# Patient Record
Sex: FEMALE | Race: WHITE | NOT HISPANIC OR LATINO | URBAN - METROPOLITAN AREA
[De-identification: names, ages, dates, MRNs, and addresses within clinical notes are randomized per-mention and may not be internally consistent; named-entity substitution may affect disease eponyms.]

---

## 2018-06-05 ENCOUNTER — EMERGENCY (EMERGENCY)
Facility: HOSPITAL | Age: 73
LOS: 1 days | Discharge: HOME | End: 2018-06-05
Attending: EMERGENCY MEDICINE | Admitting: EMERGENCY MEDICINE
Payer: MEDICARE

## 2018-06-05 VITALS
SYSTOLIC BLOOD PRESSURE: 109 MMHG | DIASTOLIC BLOOD PRESSURE: 71 MMHG | HEART RATE: 81 BPM | OXYGEN SATURATION: 95 % | RESPIRATION RATE: 16 BRPM | TEMPERATURE: 98 F

## 2018-06-05 VITALS
WEIGHT: 153 LBS | SYSTOLIC BLOOD PRESSURE: 129 MMHG | TEMPERATURE: 98 F | RESPIRATION RATE: 18 BRPM | DIASTOLIC BLOOD PRESSURE: 71 MMHG | HEART RATE: 91 BPM | OXYGEN SATURATION: 94 %

## 2018-06-05 DIAGNOSIS — Z87.891 PERSONAL HISTORY OF NICOTINE DEPENDENCE: ICD-10-CM

## 2018-06-05 DIAGNOSIS — R10.11 RIGHT UPPER QUADRANT PAIN: ICD-10-CM

## 2018-06-05 DIAGNOSIS — J44.9 CHRONIC OBSTRUCTIVE PULMONARY DISEASE, UNSPECIFIED: ICD-10-CM

## 2018-06-05 DIAGNOSIS — R07.89 OTHER CHEST PAIN: ICD-10-CM

## 2018-06-05 LAB
ALBUMIN SERPL ELPH-MCNC: 3.1 G/DL — LOW (ref 3.4–5)
ALP SERPL-CCNC: 91 U/L — SIGNIFICANT CHANGE UP (ref 40–120)
ALT FLD-CCNC: 15 U/L — SIGNIFICANT CHANGE UP (ref 12–42)
AMYLASE P1 CFR SERPL: 40 U/L — SIGNIFICANT CHANGE UP (ref 25–115)
ANION GAP SERPL CALC-SCNC: 8 MMOL/L — LOW (ref 9–16)
APPEARANCE UR: CLEAR — SIGNIFICANT CHANGE UP
AST SERPL-CCNC: 14 U/L — LOW (ref 15–37)
BILIRUB SERPL-MCNC: 0.3 MG/DL — SIGNIFICANT CHANGE UP (ref 0.2–1.2)
BILIRUB UR-MCNC: NEGATIVE — SIGNIFICANT CHANGE UP
BUN SERPL-MCNC: 21 MG/DL — SIGNIFICANT CHANGE UP (ref 7–23)
CALCIUM SERPL-MCNC: 9 MG/DL — SIGNIFICANT CHANGE UP (ref 8.5–10.5)
CHLORIDE SERPL-SCNC: 103 MMOL/L — SIGNIFICANT CHANGE UP (ref 96–108)
CK MB BLD-MCNC: 1.49 % — SIGNIFICANT CHANGE UP
CK MB CFR SERPL CALC: 0.7 NG/ML — SIGNIFICANT CHANGE UP (ref 0.5–3.6)
CK SERPL-CCNC: 47 U/L — SIGNIFICANT CHANGE UP (ref 26–192)
CO2 SERPL-SCNC: 27 MMOL/L — SIGNIFICANT CHANGE UP (ref 22–31)
COLOR SPEC: YELLOW — SIGNIFICANT CHANGE UP
CREAT SERPL-MCNC: 0.92 MG/DL — SIGNIFICANT CHANGE UP (ref 0.5–1.3)
D DIMER BLD IA.RAPID-MCNC: 415 NG/ML DDU — HIGH
DIFF PNL FLD: NEGATIVE — SIGNIFICANT CHANGE UP
GLUCOSE SERPL-MCNC: 102 MG/DL — HIGH (ref 70–99)
GLUCOSE UR QL: NEGATIVE — SIGNIFICANT CHANGE UP
HCT VFR BLD CALC: 36.3 % — SIGNIFICANT CHANGE UP (ref 34.5–45)
HGB BLD-MCNC: 11.7 G/DL — SIGNIFICANT CHANGE UP (ref 11.5–15.5)
INR BLD: 1.06 — SIGNIFICANT CHANGE UP (ref 0.88–1.16)
KETONES UR-MCNC: NEGATIVE — SIGNIFICANT CHANGE UP
LEUKOCYTE ESTERASE UR-ACNC: NEGATIVE — SIGNIFICANT CHANGE UP
LIDOCAIN IGE QN: 77 U/L — SIGNIFICANT CHANGE UP (ref 73–393)
MAGNESIUM SERPL-MCNC: 2 MG/DL — SIGNIFICANT CHANGE UP (ref 1.6–2.6)
MCHC RBC-ENTMCNC: 30.3 PG — SIGNIFICANT CHANGE UP (ref 27–34)
MCHC RBC-ENTMCNC: 32.2 G/DL — SIGNIFICANT CHANGE UP (ref 32–36)
MCV RBC AUTO: 94 FL — SIGNIFICANT CHANGE UP (ref 80–100)
NITRITE UR-MCNC: NEGATIVE — SIGNIFICANT CHANGE UP
NT-PROBNP SERPL-SCNC: 42 PG/ML — SIGNIFICANT CHANGE UP
PH UR: 6 — SIGNIFICANT CHANGE UP (ref 5–8)
PLATELET # BLD AUTO: 306 K/UL — SIGNIFICANT CHANGE UP (ref 150–400)
POTASSIUM SERPL-MCNC: 4.1 MMOL/L — SIGNIFICANT CHANGE UP (ref 3.5–5.3)
POTASSIUM SERPL-SCNC: 4.1 MMOL/L — SIGNIFICANT CHANGE UP (ref 3.5–5.3)
PROT SERPL-MCNC: 8.1 G/DL — SIGNIFICANT CHANGE UP (ref 6.4–8.2)
PROT UR-MCNC: NEGATIVE MG/DL — SIGNIFICANT CHANGE UP
PROTHROM AB SERPL-ACNC: 11.7 SEC — SIGNIFICANT CHANGE UP (ref 9.8–12.7)
RBC # BLD: 3.86 M/UL — SIGNIFICANT CHANGE UP (ref 3.8–5.2)
RBC # FLD: 13 % — SIGNIFICANT CHANGE UP (ref 10.3–16.9)
SODIUM SERPL-SCNC: 138 MMOL/L — SIGNIFICANT CHANGE UP (ref 132–145)
SP GR SPEC: <=1.005 — SIGNIFICANT CHANGE UP (ref 1–1.03)
TROPONIN I SERPL-MCNC: 0.03 NG/ML — SIGNIFICANT CHANGE UP (ref 0.02–0.06)
UROBILINOGEN FLD QL: 0.2 E.U./DL — SIGNIFICANT CHANGE UP
WBC # BLD: 8.6 K/UL — SIGNIFICANT CHANGE UP (ref 3.8–10.5)
WBC # FLD AUTO: 8.6 K/UL — SIGNIFICANT CHANGE UP (ref 3.8–10.5)

## 2018-06-05 PROCEDURE — 99285 EMERGENCY DEPT VISIT HI MDM: CPT | Mod: 25

## 2018-06-05 PROCEDURE — 76705 ECHO EXAM OF ABDOMEN: CPT | Mod: 26

## 2018-06-05 PROCEDURE — 71045 X-RAY EXAM CHEST 1 VIEW: CPT | Mod: 26

## 2018-06-05 PROCEDURE — 71275 CT ANGIOGRAPHY CHEST: CPT | Mod: 26

## 2018-06-05 PROCEDURE — 93010 ELECTROCARDIOGRAM REPORT: CPT

## 2018-06-05 RX ORDER — ONDANSETRON 8 MG/1
4 TABLET, FILM COATED ORAL ONCE
Qty: 0 | Refills: 0 | Status: COMPLETED | OUTPATIENT
Start: 2018-06-05 | End: 2018-06-05

## 2018-06-05 RX ORDER — FAMOTIDINE 10 MG/ML
20 INJECTION INTRAVENOUS ONCE
Qty: 0 | Refills: 0 | Status: COMPLETED | OUTPATIENT
Start: 2018-06-05 | End: 2018-06-05

## 2018-06-05 RX ORDER — HYDROMORPHONE HYDROCHLORIDE 2 MG/ML
0.5 INJECTION INTRAMUSCULAR; INTRAVENOUS; SUBCUTANEOUS ONCE
Qty: 0 | Refills: 0 | Status: DISCONTINUED | OUTPATIENT
Start: 2018-06-05 | End: 2018-06-05

## 2018-06-05 RX ORDER — IPRATROPIUM/ALBUTEROL SULFATE 18-103MCG
3 AEROSOL WITH ADAPTER (GRAM) INHALATION ONCE
Qty: 0 | Refills: 0 | Status: COMPLETED | OUTPATIENT
Start: 2018-06-05 | End: 2018-06-05

## 2018-06-05 RX ORDER — SODIUM CHLORIDE 9 MG/ML
500 INJECTION INTRAMUSCULAR; INTRAVENOUS; SUBCUTANEOUS ONCE
Qty: 0 | Refills: 0 | Status: COMPLETED | OUTPATIENT
Start: 2018-06-05 | End: 2018-06-05

## 2018-06-05 RX ORDER — OXYCODONE AND ACETAMINOPHEN 5; 325 MG/1; MG/1
2 TABLET ORAL ONCE
Qty: 0 | Refills: 0 | Status: DISCONTINUED | OUTPATIENT
Start: 2018-06-05 | End: 2018-06-05

## 2018-06-05 RX ORDER — KETOROLAC TROMETHAMINE 30 MG/ML
15 SYRINGE (ML) INJECTION ONCE
Qty: 0 | Refills: 0 | Status: DISCONTINUED | OUTPATIENT
Start: 2018-06-05 | End: 2018-06-05

## 2018-06-05 RX ADMIN — OXYCODONE AND ACETAMINOPHEN 2 TABLET(S): 5; 325 TABLET ORAL at 23:28

## 2018-06-05 RX ADMIN — SODIUM CHLORIDE 500 MILLILITER(S): 9 INJECTION INTRAMUSCULAR; INTRAVENOUS; SUBCUTANEOUS at 19:30

## 2018-06-05 RX ADMIN — FAMOTIDINE 20 MILLIGRAM(S): 10 INJECTION INTRAVENOUS at 18:43

## 2018-06-05 RX ADMIN — Medication 15 MILLIGRAM(S): at 19:36

## 2018-06-05 RX ADMIN — Medication 15 MILLIGRAM(S): at 21:00

## 2018-06-05 RX ADMIN — SODIUM CHLORIDE 1000 MILLILITER(S): 9 INJECTION INTRAMUSCULAR; INTRAVENOUS; SUBCUTANEOUS at 18:14

## 2018-06-05 RX ADMIN — Medication 3 MILLILITER(S): at 19:36

## 2018-06-05 NOTE — ED PROVIDER NOTE - PROGRESS NOTE DETAILS
signed out to me by guy lott pending CT and US results, US with no acute pathology, CT with no PE, lung changes c/w pts hx, perinephric stranding noted but UA negative, stable for dc home with results given to have her MD review and continue outpatient management.

## 2018-06-05 NOTE — ED PROVIDER NOTE - MEDICAL DECISION MAKING DETAILS
Diff dx includes: ptx, pneumonia, cholecystitis, choledochalithiasis, biliary colic, gastritis, pancreatitis, pe.

## 2018-06-05 NOTE — ED PROVIDER NOTE - OBJECTIVE STATEMENT
72 yo female with pmhx copd and pulmonary nodules to ED c/o 2.5 day hx of ruq, rl chest colicky sharp pain. Denies soa/cough/hemoptysis. denies syncope. No international travel within past 30 days.

## 2018-06-05 NOTE — ED ADULT TRIAGE NOTE - CHIEF COMPLAINT QUOTE
Right flank/ rib pain just under breast. Little shortness of breath. Denies n/v/d. Pt had pneumothorax end of April on left side.

## 2018-06-05 NOTE — ED ADULT NURSE NOTE - OBJECTIVE STATEMENT
2.5 days, pain with deep breath; was d/c from [facility] for pneumothorax and pnemonia; pt states "I am about to start radiation treatments for something in my lung". pt denies nausea, vomiting, diarrhea, chest pain. pt is a 73 year old female who comes into the ED complaining of pain with inspiration for 2.5 days. pt states "I was discharged from [facility] for a pneumothorax, I am feeling bad pain when I breath, its making me breath shallow". pt denies nausae, vomiting, diarrhea, dizziness, fevers, chills. pt states "I am about to start radiation treatments for something in my lung". pt has hx of COPD, arrives to ED with O2 sat 94%; states that is her normal, pt in NAD, placed on cardiac monitor, IV placed, labs sent.

## 2023-07-22 ENCOUNTER — EMERGENCY (EMERGENCY)
Facility: HOSPITAL | Age: 78
LOS: 1 days | Discharge: HOME | End: 2023-07-22
Attending: EMERGENCY MEDICINE | Admitting: EMERGENCY MEDICINE
Payer: MEDICARE

## 2023-07-22 VITALS
TEMPERATURE: 98 F | HEART RATE: 73 BPM | SYSTOLIC BLOOD PRESSURE: 129 MMHG | DIASTOLIC BLOOD PRESSURE: 69 MMHG | RESPIRATION RATE: 17 BRPM | OXYGEN SATURATION: 97 %

## 2023-07-22 VITALS
TEMPERATURE: 98 F | SYSTOLIC BLOOD PRESSURE: 121 MMHG | HEART RATE: 82 BPM | WEIGHT: 126.99 LBS | OXYGEN SATURATION: 96 % | DIASTOLIC BLOOD PRESSURE: 72 MMHG | RESPIRATION RATE: 20 BRPM

## 2023-07-22 PROBLEM — K21.9 GASTRO-ESOPHAGEAL REFLUX DISEASE WITHOUT ESOPHAGITIS: Chronic | Status: ACTIVE | Noted: 2018-06-05

## 2023-07-22 PROBLEM — J93.9 PNEUMOTHORAX, UNSPECIFIED: Chronic | Status: ACTIVE | Noted: 2018-06-05

## 2023-07-22 PROCEDURE — 99284 EMERGENCY DEPT VISIT MOD MDM: CPT

## 2023-07-22 PROCEDURE — 73630 X-RAY EXAM OF FOOT: CPT | Mod: 26,RT

## 2023-07-22 PROCEDURE — 73562 X-RAY EXAM OF KNEE 3: CPT | Mod: 26,LT

## 2023-07-22 NOTE — ED PROVIDER NOTE - OBJECTIVE STATEMENT
78-year-old female with a history of lung cancer on home oxygen presenting after mechanical fall last night.  She states she started to lose her balance but caught herself and did not fall all the way down.  She now complains of pain in the right foot, an abrasion to her left forearm, and pain in her left knee.  She states she always had her left knee when she falls and is unsure if that pain is new.  She had a previous fall about 1 week ago and was seen at [facility] and had tulio and sutures placed in the scalp.  She denies hitting her head from this fall.  She denies any headache or neck pain.

## 2023-07-22 NOTE — ED PROVIDER NOTE - PATIENT PORTAL LINK FT
You can access the FollowMyHealth Patient Portal offered by [facility] by registering at the following website: [email or web address]. By joining [facility]’s FollowMyHealth portal, you will also be able to view your health information using other applications (apps) compatible with our system.

## 2023-07-22 NOTE — ED ADULT NURSE NOTE - TEMPLATE LIST FOR HEAD TO TOE ASSESSMENT
Incoming Refill Request      Medication requested (name and dose):  TRAMADOL     Pharmacy where request should be sent: [facility].     Additional details provided by patient: PT. WAS JUST SEEN AND FORGOT TO MENTION HE IS NEEDING HIS TRAMADOL    Best call back number: [phone]    Does the patient have less than a 3 day supply:  [x] Yes  [] No    Salma BECKWITH Rep  05/26/22, 09:12 EDT           Fall

## 2023-07-22 NOTE — ED ADULT NURSE NOTE - NSFALLUNIVINTERV_ED_ALL_ED
Bed/Stretcher in lowest position, wheels locked, appropriate side rails in place/Call bell, personal items and telephone in reach/Instruct patient to call for assistance before getting out of bed/chair/stretcher/Non-slip footwear applied when patient is off stretcher/[place] to call system/Physically safe environment - no spills, clutter or unnecessary equipment/Purposeful proactive rounding/Room/bathroom lighting operational, light cord in reach

## 2023-07-22 NOTE — ED PROVIDER NOTE - PHYSICAL EXAMINATION
VITAL SIGNS: I have reviewed nursing notes and confirm.  CONSTITUTIONAL: Well-developed; well-nourished; in no acute distress.  HEAD: Normocephalic; staples in place to the anterior scalp.  sutures in place to the right upper forehead  EYES: EOM intact; conjunctiva and sclera clear.  ENT: nose appears normal  NECK: Supple, no midline c-spine tenderness  CARD: S1, S2 normal; no murmurs, gallops, or rubs. Regular rate and rhythm., Port palpable on right upper chest wall  RESP: No wheezes, rales or rhonchi.  EXT: gross swelling of the left knee with ecchymosis  NEURO: Alert, oriented. Grossly unremarkable.  PSYCH: Cooperative, appropriate. VITAL SIGNS: I have reviewed nursing notes and confirm.  CONSTITUTIONAL: Well-developed; well-nourished; in no acute distress.  HEAD: Normocephalic; staples in place to the anterior scalp.  sutures in place to the right upper forehead  EYES: EOM intact; conjunctiva and sclera clear.  ENT: nose appears normal  NECK: Supple, no midline c-spine tenderness  CARD: S1, S2 normal; no murmurs, gallops, or rubs. Regular rate and rhythm., Port palpable on right upper chest wall  RESP: No wheezes, rales or rhonchi.  EXT: gross swelling of the left knee with ecchymosis, no swelling or deformity of the right foot  NEURO: Alert, oriented. Grossly unremarkable.  PSYCH: Cooperative, appropriate.

## 2023-07-22 NOTE — ED PROVIDER NOTE - CLINICAL SUMMARY MEDICAL DECISION MAKING FREE TEXT BOX
78-year-old female with a history of lung cancer presents after a fall complaining of right foot and left knee pain as well as a skin tear to the left forearm.  We will clean and dress the forearm wound.  We will obtain x-rays of the left knee and right foot.  Doubt any fracture given that she has been walking without difficulty since the fall.  She has had recurrent falls at home but already has a home health aide.  We will discussed the importance of safety in the home but I do not feel that she warrants admission for placement and she does seem to have appropriate resources at home.

## 2023-07-26 DIAGNOSIS — M79.671 PAIN IN RIGHT FOOT: ICD-10-CM

## 2023-07-26 DIAGNOSIS — C34.90 MALIGNANT NEOPLASM OF UNSPECIFIED PART OF UNSPECIFIED BRONCHUS OR LUNG: ICD-10-CM

## 2023-07-26 DIAGNOSIS — S50.812A ABRASION OF LEFT FOREARM, INITIAL ENCOUNTER: ICD-10-CM

## 2023-07-26 DIAGNOSIS — Z87.09 PERSONAL HISTORY OF OTHER DISEASES OF THE RESPIRATORY SYSTEM: ICD-10-CM
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
## 2023-07-26 DIAGNOSIS — M25.562 PAIN IN LEFT KNEE: ICD-10-CM

## 2023-07-26 DIAGNOSIS — Z87.19 PERSONAL HISTORY OF OTHER DISEASES OF THE DIGESTIVE SYSTEM: ICD-10-CM

## 2023-08-21 ENCOUNTER — TELEPHONE (OUTPATIENT)
Age: 78
End: 2023-08-21

## 2023-08-21 NOTE — TELEPHONE ENCOUNTER
Hi, Doctor. Meg agreed to take my sister Berenice Public on as a new patient. At least he was going to meet with her, but she hasn't moved into the area yet. It won't be until the beginning of September, so I need to reschedule that appointment. I already cancelled the appointment we have, but I need to reschedule it now that she'll be coming around the first week in September. My name is Verner Nova birthday is [id number] [phone]. Her name is Princess Jacobs. Her birthday is May 4th, 1945. Please give me a call back [phone]. Thank you.

## 2023-08-28 ENCOUNTER — EMERGENCY (EMERGENCY)
Facility: HOSPITAL | Age: 78
LOS: 1 days | Discharge: HOME | End: 2023-08-28
Attending: EMERGENCY MEDICINE | Admitting: EMERGENCY MEDICINE
Payer: MEDICARE

## 2023-08-28 VITALS
RESPIRATION RATE: 16 BRPM | OXYGEN SATURATION: 99 % | HEIGHT: 63 IN | SYSTOLIC BLOOD PRESSURE: 111 MMHG | DIASTOLIC BLOOD PRESSURE: 66 MMHG | TEMPERATURE: 98 F | HEART RATE: 69 BPM | WEIGHT: 128.97 LBS

## 2023-08-28 VITALS
TEMPERATURE: 98 F | OXYGEN SATURATION: 100 % | RESPIRATION RATE: 16 BRPM | DIASTOLIC BLOOD PRESSURE: 72 MMHG | HEART RATE: 71 BPM | SYSTOLIC BLOOD PRESSURE: 111 MMHG

## 2023-08-28 PROBLEM — C34.90 MALIGNANT NEOPLASM OF UNSPECIFIED PART OF UNSPECIFIED BRONCHUS OR LUNG: Chronic | Status: ACTIVE | Noted: 2023-07-22

## 2023-08-28 LAB
ALBUMIN SERPL ELPH-MCNC: 3.1 G/DL — LOW (ref 3.4–5)
ALP SERPL-CCNC: 101 U/L — SIGNIFICANT CHANGE UP (ref 40–120)
ALT FLD-CCNC: 15 U/L — SIGNIFICANT CHANGE UP (ref 12–42)
ANION GAP SERPL CALC-SCNC: 3 MMOL/L — LOW (ref 9–16)
AST SERPL-CCNC: 28 U/L — SIGNIFICANT CHANGE UP (ref 15–37)
BASOPHILS # BLD AUTO: 0.04 K/UL — SIGNIFICANT CHANGE UP (ref 0–0.2)
BASOPHILS NFR BLD AUTO: 0.5 % — SIGNIFICANT CHANGE UP (ref 0–2)
BILIRUB SERPL-MCNC: 0.2 MG/DL — SIGNIFICANT CHANGE UP (ref 0.2–1.2)
BUN SERPL-MCNC: 20 MG/DL — SIGNIFICANT CHANGE UP (ref 7–23)
CALCIUM SERPL-MCNC: 9 MG/DL — SIGNIFICANT CHANGE UP (ref 8.5–10.5)
CHLORIDE SERPL-SCNC: 100 MMOL/L — SIGNIFICANT CHANGE UP (ref 96–108)
CO2 SERPL-SCNC: 33 MMOL/L — HIGH (ref 22–31)
CREAT SERPL-MCNC: 0.76 MG/DL — SIGNIFICANT CHANGE UP (ref 0.5–1.3)
EGFR: 81 ML/MIN/1.73M2 — SIGNIFICANT CHANGE UP
EOSINOPHIL # BLD AUTO: 0.01 K/UL — SIGNIFICANT CHANGE UP (ref 0–0.5)
EOSINOPHIL NFR BLD AUTO: 0.1 % — SIGNIFICANT CHANGE UP (ref 0–6)
FLUAV AG NPH QL: SIGNIFICANT CHANGE UP
FLUBV AG NPH QL: SIGNIFICANT CHANGE UP
GLUCOSE SERPL-MCNC: 111 MG/DL — HIGH (ref 70–99)
HCT VFR BLD CALC: 34.5 % — SIGNIFICANT CHANGE UP (ref 34.5–45)
HGB BLD-MCNC: 10.8 G/DL — LOW (ref 11.5–15.5)
IMM GRANULOCYTES NFR BLD AUTO: 0.5 % — SIGNIFICANT CHANGE UP (ref 0–0.9)
LYMPHOCYTES # BLD AUTO: 1.68 K/UL — SIGNIFICANT CHANGE UP (ref 1–3.3)
LYMPHOCYTES # BLD AUTO: 19 % — SIGNIFICANT CHANGE UP (ref 13–44)
MCHC RBC-ENTMCNC: 29.6 PG — SIGNIFICANT CHANGE UP (ref 27–34)
MCHC RBC-ENTMCNC: 31.3 GM/DL — LOW (ref 32–36)
MCV RBC AUTO: 94.5 FL — SIGNIFICANT CHANGE UP (ref 80–100)
MONOCYTES # BLD AUTO: 0.7 K/UL — SIGNIFICANT CHANGE UP (ref 0–0.9)
MONOCYTES NFR BLD AUTO: 7.9 % — SIGNIFICANT CHANGE UP (ref 2–14)
NEUTROPHILS # BLD AUTO: 6.39 K/UL — SIGNIFICANT CHANGE UP (ref 1.8–7.4)
NEUTROPHILS NFR BLD AUTO: 72 % — SIGNIFICANT CHANGE UP (ref 43–77)
NRBC # BLD: 0 /100 WBCS — SIGNIFICANT CHANGE UP (ref 0–0)
PLATELET # BLD AUTO: 326 K/UL — SIGNIFICANT CHANGE UP (ref 150–400)
POTASSIUM SERPL-MCNC: 4.3 MMOL/L — SIGNIFICANT CHANGE UP (ref 3.5–5.3)
POTASSIUM SERPL-SCNC: 4.3 MMOL/L — SIGNIFICANT CHANGE UP (ref 3.5–5.3)
PROT SERPL-MCNC: 7.8 G/DL — SIGNIFICANT CHANGE UP (ref 6.4–8.2)
RBC # BLD: 3.65 M/UL — LOW (ref 3.8–5.2)
RBC # FLD: 14.1 % — SIGNIFICANT CHANGE UP (ref 10.3–14.5)
RSV RNA NPH QL NAA+NON-PROBE: SIGNIFICANT CHANGE UP
SARS-COV-2 RNA SPEC QL NAA+PROBE: SIGNIFICANT CHANGE UP
SODIUM SERPL-SCNC: 136 MMOL/L — SIGNIFICANT CHANGE UP (ref 132–145)
WBC # BLD: 8.86 K/UL — SIGNIFICANT CHANGE UP (ref 3.8–10.5)
WBC # FLD AUTO: 8.86 K/UL — SIGNIFICANT CHANGE UP (ref 3.8–10.5)

## 2023-08-28 PROCEDURE — 70450 CT HEAD/BRAIN W/O DYE: CPT | Mod: 26,MH

## 2023-08-28 PROCEDURE — 72125 CT NECK SPINE W/O DYE: CPT | Mod: 26,MH

## 2023-08-28 PROCEDURE — 73030 X-RAY EXAM OF SHOULDER: CPT | Mod: 26,RT

## 2023-08-28 PROCEDURE — 71045 X-RAY EXAM CHEST 1 VIEW: CPT | Mod: 26

## 2023-08-28 PROCEDURE — 99285 EMERGENCY DEPT VISIT HI MDM: CPT

## 2023-08-28 RX ORDER — ACETAMINOPHEN 500 MG
1000 TABLET ORAL ONCE
Refills: 0 | Status: COMPLETED | OUTPATIENT
Start: 2023-08-28 | End: 2023-08-28

## 2023-08-28 RX ORDER — AZITHROMYCIN 500 MG/1
1 TABLET, FILM COATED ORAL
Qty: 6 | Refills: 0
Start: 2023-08-28 | End: 2023-09-01

## 2023-08-28 RX ORDER — MORPHINE SULFATE 50 MG/1
2 CAPSULE, EXTENDED RELEASE ORAL ONCE
Refills: 0 | Status: DISCONTINUED | OUTPATIENT
Start: 2023-08-28 | End: 2023-08-28

## 2023-08-28 RX ORDER — OXYCODONE AND ACETAMINOPHEN 5; 325 MG/1; MG/1
1 TABLET ORAL
Qty: 8 | Refills: 0
Start: 2023-08-28 | End: 2023-08-29

## 2023-08-28 RX ADMIN — MORPHINE SULFATE 2 MILLIGRAM(S): 50 CAPSULE, EXTENDED RELEASE ORAL at 12:27

## 2023-08-28 RX ADMIN — Medication 400 MILLIGRAM(S): at 08:51

## 2023-08-28 NOTE — ED PROVIDER NOTE - PROGRESS NOTE DETAILS
called CTC for [place] Kenny LLOYD:   Device rep came and put Ben Bolt collar on patient.  Pt has follow up with Dr. Jacobs scheduled.  No numbness or tingling in hands.  Will dc w percocet for pain.  Return precautions discussed.  CXR read w consolidation in upper lung in area of known lung mass.  Will give PO azithro.

## 2023-08-28 NOTE — ED ADULT TRIAGE NOTE - HEIGHT IN FEET
Pt states daughter's  got Covid positive test result back yesterday; daughter was with her Wed shelli and then  started symptoms on Friday and went to get tested. Pt currently doesn't have symptoms but said he has a type of asthma and wants to know what/how does he monitor any symptoms he may end up getting?   Please advise him at [phone] (he will be on-line between 9:15-11:35) 5

## 2023-08-28 NOTE — ED PROVIDER NOTE - CLINICAL SUMMARY MEDICAL DECISION MAKING FREE TEXT BOX
Spoke with neurosurgery consult Dr. Ferrell, CT findings discussed in detail, patient has no neurological deficit or symptoms at this time.  He will arrange for a c-collar to be sent to the [facility] ED.  Her morning home health aide is here in the ER to accompany her home if appropriate.  Will await for collar, if ambulatory and pain is controlled can be discharged.  Signed out to Dr. Cox pending final dispo.

## 2023-08-28 NOTE — ED PROVIDER NOTE - NSFOLLOWUPINSTRUCTIONS_ED_ALL_ED_FT
Follow-up with neurosurgery Dr. Coronel, see below for office address and phone number.  Let the office know you are seen in the ER, and Dr. Mcguire wanted you to follow-up Tuesday or Wednesday of this week.    What is a neck fracture?  A "fracture" is another word for a broken bone. A neck fracture is when a person breaks 1 of the bones in the neck. The medical term for the bones in the neck is "cervical vertebrae" (figure 1).    There are different types of neck fractures, depending on which bone breaks and how it breaks.    Some neck fractures are much more serious than others. In a serious neck fracture, the spinal cord (the bundle of nerves that runs down the back) can also be damaged. This can lead to paralysis. Paralysis is when a person is unable to move certain muscles or parts of their body.    Because a serious neck fracture can lead to paralysis, a person with a possible neck fracture should lie still and not move (or be moved) after an injury. They should wait for emergency workers, who can bring them safely to the hospital. If a person with a neck fracture moves (or is moved), it could make their neck fracture and spinal cord damage much worse.    What are the symptoms of a neck fracture?  Symptoms of a neck fracture can include:    ?Neck pain    ?Neck muscle tightness or spasm    ?Being unable to move the neck or turn the head    ?Weakness or paralysis in the arms or legs    ?Numbness or tingling in the arms or legs    Is there a test for a neck fracture?  Yes. The doctor or nurse will ask about your symptoms and do an exam. They will also order an imaging test, such as an X-ray or CT scan, which creates pictures of the inside of the body. If the doctor suspects you could have spinal cord damage, they might also order another imaging test called an MRI.    How is a neck fracture treated?  Treatment depends on the type of neck fracture a person has, how serious it is, and whether it also causes paralysis.    People with a severe neck fracture or paralysis are treated in the hospital. There, the doctor can monitor them and treat their medical problems. For example, some people might need a breathing tube to help with breathing or medicines to help reduce swelling in the spinal cord.    Treatment of a neck fracture might involve:    ?Surgery – A severe neck fracture might be treated with surgery. During surgery, the doctor will put the neck bones back in the correct position. They will attach or "fuse" the bones to each other with screws or other devices. This helps them stay in place.    ?A "halo" device – If a fracture is serious but the neck bones have not moved out of place, the person might not need surgery. Instead, they might get a device called a "halo." This type of device is held in place with screws that go into the skull. It is attached with bars to a body brace or vest. The device prevents the head and neck from moving while the fracture heals. Sometimes, both surgery and a halo device are needed to treat a fracture.    ?A collar or brace – For some neck fractures, the person might need to wear a special collar or brace. These hold the neck in place while it heals. They do not restrict movement as much as a halo device.    ?Pain medicines – Treatment for a neck fracture also includes medicine to treat the pain. Doctors can prescribe a strong pain medicine if people have a lot of pain or a severe fracture. For mild fractures, doctors usually recommend that people take an over-the-counter pain medicine. Over-the-counter medicines include acetaminophen (sample brand name: Tylenol), ibuprofen (sample brand names: Advil, Motrin), and naproxen (sample brand name: Aleve).    ?Physical therapy – After your fracture heals, you might need to work with a physical therapist (exercise expert). They will teach you exercises to strengthen your neck muscles and help your neck move more easily.    How long does a neck fracture take to heal?  A neck fracture usually takes months to heal, depending on the fracture.    Healing time also depends on the person. Healthy children usually heal much more quickly than older adults or adults with other medical problems.    Can I do anything to improve the healing process?  Yes. It's important to follow all of your doctor's instructions while your fracture is healing. Plus, doctors usually recommend that people with a fracture:    ?Eat a healthy diet that includes enough calcium, vitamin D, and protein (figure 2)    ?Stop smoking. A fracture can take longer to heal if you smoke.    When should I call my doctor or nurse?  After going home, your doctor or nurse will tell you when to call.    Go to the emergency department right away if you:    ?Have trouble breathing    ?Have trouble moving your arms or legs    ?Have trouble controlling your bladder or bowels    Call your doctor or nurse for advice if you:    ?Lose some feeling in your arms or legs    ?Have problems with your neck brace, such as fluid draining from around the screws or sores where the brace presses on your skin    More on this topic

## 2023-08-28 NOTE — ED PROVIDER NOTE - OBJECTIVE STATEMENT
78-year-old female with history of lung cancer, getting chemo infusions intermittently, COPD on continuous home O2 at 2 L nasal cannula, GERD with complaints of posterior neck pain after accidental fall at home.  Patient was sitting on the edge of the bed and slipped off her bed falling forward, according to EMS she extended her neck and then felt pain and tingling down her bilateral arms.  Her home health aide who is with her overnight was able to gently place her on the bed after helping her get off the floor.  On EMS arrival patient's symptoms had started to resolve.  Her arm tingling and numbness resolved on arrival to the ED.  Patient arrived to the ED with c-collar in place. Patient declined pain meds on arrival to the ED.

## 2023-08-28 NOTE — ED POST DISCHARGE NOTE - RESULT SUMMARY
NAILA opacity on peervue. patient is still active in ED, discussed with attending Dr Cox. Will reevaluate patient.

## 2023-08-28 NOTE — ED PROVIDER NOTE - CARE PROVIDER_API CALL
[facility]  Neurosurgery  [address] [address]  Phone: [phone]  Fax: [phone]  Follow Up Time: 1-3 Days

## 2023-08-28 NOTE — ED PROVIDER NOTE - PHYSICAL EXAMINATION
VSS in NAD non toxic appearing   NCAT EOMI PERRL OP clear  midline c spine tenderness approx C6-C7 with some swelling   heart RRR no murmur   lungs CTA no wheezing no rales no rhonchi   abd soft NT ND no CVAT no guarding no rebound   normal neuro exam CN I-XII grossly intact, no groos motor or sensory deficits  no peripheral c/c/e

## 2023-08-28 NOTE — ED ADULT NURSE REASSESSMENT NOTE - NS ED NURSE REASSESS COMMENT FT1
Pt d/c but refusing ambulette transportation home. Pt requires supplemental oxygen but pt is refusing ambulette transportation home. Pt stated "I  will take a lyft home, I will use oxygen when I get home". Pt desats to the low 80's when not on oxygen but pt still refuses to wait for ambulette service. Pt educated on risks of being without oxygen, but pt is still refusing. Pt is A&Ox4, home health aide with pt. MD Cox and ODALIS Gregory made aware.

## 2023-08-28 NOTE — ED ADULT NURSE NOTE - NSFALLRISKINTERV_ED_ALL_ED

## 2023-08-28 NOTE — ED PROVIDER NOTE - PATIENT PORTAL LINK FT
You can access the FollowMyHealth Patient Portal offered by [facility] by registering at the following website: [email or web address]. By joining [facility]’s FollowMyHealth portal, you will also be able to view your health information using other applications (apps) compatible with our system.

## 2023-08-28 NOTE — ED ADULT TRIAGE NOTE - CHIEF COMPLAINT QUOTE
Pt brought in by EMS in a C-collar after a slip off the edge of her bed. Pt states she hit her forehead on the ground then her head went back. Pt complains of neck pain 4/10  and experienced about 20 minutes of bilateral hand tingling that resolved.

## 2023-08-29 ENCOUNTER — APPOINTMENT (OUTPATIENT)
Dept: SPINE | Facility: CLINIC | Age: 78
End: 2023-08-29

## 2023-08-29 PROBLEM — Z00.00 ENCOUNTER FOR PREVENTIVE HEALTH EXAMINATION: Status: ACTIVE | Noted: 2023-08-29

## 2023-08-31 ENCOUNTER — TELEPHONE (OUTPATIENT)
Age: 78
End: 2023-08-31

## 2023-08-31 DIAGNOSIS — J44.9 CHRONIC OBSTRUCTIVE PULMONARY DISEASE, UNSPECIFIED: ICD-10-CM
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
## 2023-08-31 DIAGNOSIS — M54.2 CERVICALGIA: ICD-10-CM
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
## 2023-08-31 DIAGNOSIS — Z85.118 PERSONAL HISTORY OF OTHER MALIGNANT NEOPLASM OF BRONCHUS AND LUNG: ICD-10-CM

## 2023-08-31 DIAGNOSIS — S12.9XXA FRACTURE OF NECK, UNSPECIFIED, INITIAL ENCOUNTER: ICD-10-CM

## 2023-08-31 DIAGNOSIS — Z20.822 CONTACT WITH AND (SUSPECTED) EXPOSURE TO COVID-19: ICD-10-CM

## 2023-08-31 DIAGNOSIS — Z99.81 DEPENDENCE ON SUPPLEMENTAL OXYGEN: ICD-10-CM

## 2023-08-31 DIAGNOSIS — S02.2XXA FRACTURE OF NASAL BONES, INITIAL ENCOUNTER FOR CLOSED FRACTURE: ICD-10-CM

## 2023-08-31 NOTE — TELEPHONE ENCOUNTER
L/M    Jenny Suarez calling. I'm calling on behalf of my sister, Randal Martin. Her birthday was May 4th, 1945. She had one or two appointments scheduled shortly for Doctor Meg and she passed away yesterday. So I'm calling to cancel those appointments.  You can call me back at [phone]
